# Patient Record
Sex: FEMALE | Race: WHITE | Employment: PART TIME | URBAN - METROPOLITAN AREA
[De-identification: names, ages, dates, MRNs, and addresses within clinical notes are randomized per-mention and may not be internally consistent; named-entity substitution may affect disease eponyms.]

---

## 2017-05-26 ENCOUNTER — APPOINTMENT (OUTPATIENT)
Dept: VASCULAR SURGERY | Facility: CLINIC | Age: 37
End: 2017-05-26

## 2017-05-26 PROCEDURE — 99207 ZZC VEINSOLUTIONS FREE SCREENING: CPT | Performed by: SURGERY

## 2018-01-02 ENCOUNTER — OFFICE VISIT (OUTPATIENT)
Dept: FAMILY MEDICINE | Facility: CLINIC | Age: 38
End: 2018-01-02
Payer: COMMERCIAL

## 2018-01-02 VITALS
HEART RATE: 80 BPM | SYSTOLIC BLOOD PRESSURE: 100 MMHG | BODY MASS INDEX: 20.41 KG/M2 | HEIGHT: 63 IN | DIASTOLIC BLOOD PRESSURE: 58 MMHG | WEIGHT: 115.2 LBS | RESPIRATION RATE: 12 BRPM

## 2018-01-02 DIAGNOSIS — Z00.00 ENCOUNTER FOR ROUTINE ADULT HEALTH EXAMINATION WITHOUT ABNORMAL FINDINGS: Primary | ICD-10-CM

## 2018-01-02 DIAGNOSIS — H92.02 LEFT EAR PAIN: ICD-10-CM

## 2018-01-02 DIAGNOSIS — T30.0 SKIN BURN: ICD-10-CM

## 2018-01-02 LAB
SPECIMEN SOURCE: ABNORMAL
WET PREP SPEC: ABNORMAL

## 2018-01-02 PROCEDURE — 99395 PREV VISIT EST AGE 18-39: CPT | Performed by: FAMILY MEDICINE

## 2018-01-02 PROCEDURE — 87210 SMEAR WET MOUNT SALINE/INK: CPT | Performed by: FAMILY MEDICINE

## 2018-01-02 RX ORDER — ACETAMINOPHEN AND CODEINE PHOSPHATE 120; 12 MG/5ML; MG/5ML
SOLUTION ORAL
COMMUNITY
Start: 2017-12-15 | End: 2019-01-24 | Stop reason: ALTCHOICE

## 2018-01-02 RX ORDER — PREDNISONE 20 MG/1
20 TABLET ORAL DAILY
Qty: 5 TABLET | Refills: 0 | Status: SHIPPED | OUTPATIENT
Start: 2018-01-02 | End: 2019-01-24

## 2018-01-02 RX ORDER — DESOGESTREL AND ETHINYL ESTRADIOL 0.15-0.03
1 KIT ORAL DAILY
Qty: 90 TABLET | Refills: 3 | Status: SHIPPED | OUTPATIENT
Start: 2018-01-02 | End: 2018-11-30

## 2018-01-02 RX ORDER — SILVER SULFADIAZINE 10 MG/G
CREAM TOPICAL 2 TIMES DAILY
Qty: 85 G | Refills: 1 | Status: SHIPPED | OUTPATIENT
Start: 2018-01-02 | End: 2018-01-09

## 2018-01-02 NOTE — NURSING NOTE
"Chief Complaint   Patient presents with     Physical     non-fasting       Initial /58  Pulse 80  Resp 12  Ht 5' 3\" (1.6 m)  Wt 115 lb 3.2 oz (52.3 kg)  LMP 12/12/2017  Breastfeeding? No  BMI 20.41 kg/m2 Estimated body mass index is 20.41 kg/(m^2) as calculated from the following:    Height as of this encounter: 5' 3\" (1.6 m).    Weight as of this encounter: 115 lb 3.2 oz (52.3 kg).  Medication Reconciliation: complete   Karol Padron CMA (AAMA)    "

## 2018-01-02 NOTE — PROGRESS NOTES
SUBJECTIVE:   CC: Bibi Madera is an 37 year old woman who presents for preventive health visit.     Healthy Habits:  Answers for HPI/ROS submitted by the patient on 1/2/2018   Annual Exam:  Getting at least 3 servings of Calcium per day:: Yes  Bi-annual eye exam:: Yes  Dental care twice a year:: Yes  Sleep apnea or symptoms of sleep apnea:: None  Diet:: Regular (no restrictions)  Taking medications regularly:: Yes  Medication side effects:: Not applicable  Additional concerns today:: No  PHQ-2 Score: 0    Contraception  Patient had a daughter last October. She now has two children and reports that she is done having children. She is currently still on a low dose of oral birth control (norethindrone) that she was started on while breast feeding. Patient desires to go back to the birth control she was on before her pregnancies. She reports not having any cramping during her period. She was not anemic during her pregnancy or birth. She nursed for 9 months for each child.     Burn on right hand  Patient burned her right hand, below the thumb, with steam when cooking. Incident occurred before Christmas. The burn did not burn off a layer of skin. She states that the burn has healed well since, but a blister did form and opened so she now keeps it bandaged. The are is still red and tender    Vaginal itching  A few weeks ago she thought she had a yeast infection because she was experiencing on and off itchy. She did notice slightly thicker than normal discharge but did not think it was abnormal. Inquired if symptoms could return.     Left ear pain  She has gone to a specialist 2 years ago early into her pregnancy because of a severe cold and ever since then she has continued to experience less clear hearing from her left ear. She was told from the specialist that she has water in her middle ear and will need a procedure to correct it. She was given prednisone but did not start it because she was breast feeding at  the time. Still having this plugged issue, would consider taking this med now    Diet  She tries to exercise regularly but has a difficult time doing so because of her children.     Other problems to add on...  -She had a slight thyroid enlargement when she was a child. She reports that her thyroid levels have always been normal.   -She has not had an eye exam recently    Today's PHQ-2 Score: PHQ-2 ( 1999 Pf[employer]) 1/2/2018 12/16/2016   Q1: Little interest or pleasure in doing things 0 0   Q2: Feeling down, depressed or hopeless 0 0   PHQ-2 Score 0 0   Q1: Little interest or pleasure in doing things Not at all -   Q2: Feeling down, depressed or hopeless Not at all -   PHQ-2 Score 0 -     Abuse: Current or Past(Physical, Sexual or Emotional)- No  Do you feel safe in your environment - Yes    Social History   Substance Use Topics     Smoking status: Never Smoker     Smokeless tobacco: Never Used     Alcohol use No     If you drink alcohol do you typically have >3 drinks per day or >7 drinks per week? No                     Reviewed orders with patient.  Reviewed health maintenance and updated orders accordingly - Yes    Mammogram not appropriate for this patient based on age.    Pertinent mammograms are reviewed under the imaging tab.  History of abnormal Pap smear: NO - age 30- 65 PAP every 3 years recommended    Reviewed and updated as needed this visit by clinical staff  Tobacco  Allergies  Meds  Med Hx  Surg Hx  Fam Hx  Soc Hx        Reviewed and updated as needed this visit by Provider        ROS:  C: NEGATIVE for fever, chills, change in weight  I: POSITIVE for burn on right hand NEGATIVE for worrisome rashes, moles or lesions  E: NEGATIVE for vision changes or irritation  ENT: POSITIVE for left ear pain  NEGATIVE for mouth and throat problems  R: NEGATIVE for significant cough or SOB  B: NEGATIVE for masses, tenderness or discharge  CV: NEGATIVE for chest pain, palpitations or peripheral edema  GI: NEGATIVE  "for nausea, abdominal pain, heartburn, or change in bowel habits  : POSITIVE for vaginal itching NEGATIVE for unusual urinary or vaginal symptoms. Periods are regular.  M: NEGATIVE for significant arthralgias or myalgia  N: NEGATIVE for weakness, dizziness or paresthesias  P: NEGATIVE for changes in mood or affect    This document serves as a record of the services and decisions personally performed and made by Lisbeth Hannah MD. It was created on her behalf by Serina Franklin, a trained medical scribe. The creation of this document is based on the provider's statements to the medical scribe.  Serina Franklin January 2, 2018 8:17 AM     OBJECTIVE:   /58  Pulse 80  Resp 12  Ht 5' 3\" (1.6 m)  Wt 115 lb 3.2 oz (52.3 kg)  LMP 12/12/2017  Breastfeeding? No  BMI 20.41 kg/m2     EXAM:  GENERAL: healthy, alert and no distress  EYES: Eyes grossly normal to inspection, PERRL and conjunctivae and sclerae normal  HENT: ear canals and TM's normal, nose and mouth without ulcers or lesions  NECK: no adenopathy, no asymmetry, masses, or scars and thyroid normal to palpation  RESP: lungs clear to auscultation - no rales, rhonchi or wheezes  BREAST: normal without masses, tenderness or nipple discharge and no palpable axillary masses or adenopathy  CV: regular rate and rhythm, normal S1 S2, no S3 or S4, no murmur, click or rub, no peripheral edema and peripheral pulses strong  ABDOMEN: soft, nontender, no hepatosplenomegaly, no masses and bowel sounds normal  MS: no gross musculoskeletal defects noted, no edema  SKIN: no suspicious lesions or rashes. Erythematic burn on right hand, below the thumb, superficial, opened blister area is 1cm round, tender  NEURO: Normal strength and tone, mentation intact and speech normal  PSYCH: mentation appears normal, affect normal/bright    ASSESSMENT/PLAN:   (Z00.00) Encounter for routine adult health examination without abnormal findings  (primary encounter diagnosis)  Comment: " "Patient is not fasting today. Will perform fasting labs in the future. Patient is doing well overall, other than the right hand burn and left ear pain. Discussed considering an IUD and discussed copper (progesterone only) IUD vs Mirena. Discussed possible side effects and possible risks associated with IUD's. Patient will think about it. Recommended checking with her insurance to see if it is covered.   Plan: desogestrel-ethinyl estradiol (APRI) 0.15-30         MG-MCG per tablet, Wet prep, Lipid panel reflex        to direct LDL Fasting, **TSH with free T4         reflex FUTURE anytime, Basic metabolic panel          (T30.0) Skin burn  Comment: Will start patient on silver sulfadiazine. Apply twice a day and keep area covered. Regular band-aid okay. F/u if not improving this week  Plan: silver sulfADIAZINE (SILVADENE) 1 % cream          (H92.02) Left ear pain  Comment: Will start patient on 5 day course of prednisone. Discussed possible side effects of prednisone. Recommend taking it in the morning. Recommend also taking an anti-histamine such as Claritin daily.  Plan: predniSONE (DELTASONE) 20 MG tab            COUNSELING:   Reviewed preventive health counseling, as reflected in patient instructions       Regular exercise       Healthy diet/nutrition       Contraception     reports that she has never smoked. She has never used smokeless tobacco.  Estimated body mass index is 20.41 kg/(m^2) as calculated from the following:    Height as of this encounter: 5' 3\" (1.6 m).    Weight as of this encounter: 115 lb 3.2 oz (52.3 kg).     Counseling Resources:  ATP IV Guidelines  Pooled Cohorts Equation Calculator  Breast Cancer Risk Calculator  FRAX Risk Assessment   ICSI Preventive Guidelines  Dietary Guidelines for Americans, 2010  USDA's MyPlate  ASA Prophylaxis  Lung CA Screening    The information in this document, created by the medical scribe for me, accurately reflects the services I personally performed and the " decisions made by me. I have reviewed and approved this document for accuracy prior to leaving the patient care area.  January 2, 2018 8:17 AM    Lisbeth Hannah MD  [facility]

## 2018-01-02 NOTE — MR AVS SNAPSHOT
After Visit Summary   1/2/2018    Bibi Madera    MRN: [id number]           Patient Information     Date Of Birth          1980        Visit Information        Provider Department      1/2/2018 8:00 AM Lisbeth Hannah MD [facility]        Today's Diagnoses     Encounter for routine adult health examination without abnormal findings    -  1    Skin burn        Left ear pain          Care Instructions      Preventive Health Recommendations  Female Ages 26 - 39  Yearly exam:   See your health care provider every year in order to    Review health changes.     Discuss preventive care.      Review your medicines if you your doctor has prescribed any.    Until age 30: Get a Pap test every three years (more often if you have had an abnormal result).    After age 30: Talk to your doctor about whether you should have a Pap test every 3 years or have a Pap test with HPV screening every 5 years.   You do not need a Pap test if your uterus was removed (hysterectomy) and you have not had cancer.  You should be tested each year for STDs (sexually transmitted diseases), if you're at risk.   Talk to your provider about how often to have your cholesterol checked.  If you are at risk for diabetes, you should have a diabetes test (fasting glucose).  Shots: Get a flu shot each year. Get a tetanus shot every 10 years.   Nutrition:     Eat at least 5 servings of fruits and vegetables each day.    Eat whole-grain bread, whole-wheat pasta and brown rice instead of white grains and rice.    Talk to your provider about Calcium and Vitamin D.     Lifestyle    Exercise at least 150 minutes a week (30 minutes a day, 5 days of the week). This will help you control your weight and prevent disease.    Limit alcohol to one drink per day.    No smoking.     Wear sunscreen to prevent skin cancer.    See your dentist every six months for an exam and cleaning.            Follow-ups after your visit       "  Follow-up notes from your care team     Return in about 1 year (around 1/2/2019).      Future tests that were ordered for you today     Open Future Orders        Priority Expected Expires Ordered    Lipid panel reflex to direct LDL Fasting Routine 1/2/2018 1/2/2019 1/2/2018    **TSH with free T4 reflex FUTURE anytime Routine 1/2/2018 1/2/2019 1/2/2018    Basic metabolic panel Routine  1/2/2019 1/2/2018            Who to contact     If you have questions or need follow up information about today's clinic visit or your schedule please contact [facility] directly at [phone].  Normal or non-critical lab and imaging results will be communicated to you by [employer], letter or phone within 4 business days after the clinic has received the results. If you do not hear from us within 7 days, please contact the clinic through [employer] or phone. If you have a critical or abnormal lab result, we will notify you by phone as soon as possible.  Submit refill requests through mGenerator or call your pharmacy and they will forward the refill request to us. Please allow 3 business days for your refill to be completed.          Additional Information About Your Visit        [employer] Information     mGenerator gives you secure access to your electronic health record. If you see a primary care provider, you can also send messages to your care team and make appointments. If you have questions, please call your primary care clinic.  If you do not have a primary care provider, please call [phone] and they will assist you.        Care EveryWhere ID     This is your Care EveryWhere ID. This could be used by other organizations to access your [facility] medical records  [id number]        Your Vitals Were     Pulse Respirations Height Last Period Breastfeeding? BMI (Body Mass Index)    80 12 5' 3\" (1.6 m) 12/12/2017 No 20.41 kg/m2       Blood Pressure from Last 3 Encounters:   01/02/18 100/58   12/16/16 102/58   10/09/16 " 106/65    Weight from Last 3 Encounters:   01/02/18 115 lb 3.2 oz (52.3 kg)   12/16/16 123 lb (55.8 kg)   10/07/16 139 lb (63 kg)              We Performed the Following     Wet prep          Today's Medication Changes          These changes are accurate as of: 1/2/18  8:40 AM.  If you have any questions, ask your nurse or doctor.               Start taking these medicines.        Dose/Directions    desogestrel-ethinyl estradiol 0.15-30 MG-MCG per tablet   Commonly known as:  APRI   Used for:  Encounter for routine adult health examination without abnormal findings   Started by:  Lisbeth aHnnah MD        Dose:  1 tablet   Take 1 tablet by mouth daily   Quantity:  90 tablet   Refills:  3       predniSONE 20 MG tablet   Commonly known as:  DELTASONE   Used for:  Left ear pain   Started by:  Lisbeth Hannah MD        Dose:  20 mg   Take 1 tablet (20 mg) by mouth daily   Quantity:  5 tablet   Refills:  0       silver sulfADIAZINE 1 % cream   Commonly known as:  SILVADENE   Used for:  Skin burn   Started by:  Lisbeth Hannah MD        Apply topically 2 times daily for 7 days   Quantity:  85 g   Refills:  1            Where to get your medicines      These medications were sent to [facility] [place] [address] [address] [id number]     Phone:  [phone]     desogestrel-ethinyl estradiol 0.15-30 MG-MCG per tablet    predniSONE 20 MG tablet    silver sulfADIAZINE 1 % cream                Primary Care Provider Office Phone # Fax #    Lisbeth Hannah [phone]       [id number]  [address]   [facility] [id number]        Equal Access to Services     [facility] AH: Hadii mike Diaz, waaxda luqadaha, qaybta kaalmada adeegyada, ariella robin. So [facility] [place] [phone].    ATENCIÓN: Si habla español, tiene a sun disposición servicios gratuitos de asistencia lingüística. Llame al  [phone].    We comply with applicable federal civil rights laws and [place] laws. We do not discriminate on the basis of race, color, national origin, age, disability, sex, sexual orientation, or gender identity.            Thank you!     Thank you for choosing [facility]  for your care. Our goal is always to provide you with excellent care. Hearing back from our patients is one way we can continue to improve our services. Please take a few minutes to complete the written survey that you may receive in the mail after your visit with us. Thank you!             Your Updated Medication List - Protect others around you: Learn how to safely use, store and throw away your medicines at www.disposemymeds.org.          This list is accurate as of: 18  8:40 AM.  Always use your most recent med list.                   Brand Name Dispense Instructions for use Diagnosis    desogestrel-ethinyl estradiol 0.15-30 MG-MCG per tablet    APRI    90 tablet    Take 1 tablet by mouth daily    Encounter for routine adult health examination without abnormal findings       ibuprofen 400 MG tablet    ADVIL/MOTRIN    120 tablet    Take 1-2 tablets (400-800 mg) by mouth every 6 hours as needed for other (cramping)    S/P repeat low transverse        ketoconazole 2 % shampoo    NIZORAL    120 mL    Apply to the affected area and wash off after 5 minutes.    Seborrheic dermatitis       norethindrone 0.35 MG per tablet    MICRONOR          predniSONE 20 MG tablet    DELTASONE    5 tablet    Take 1 tablet (20 mg) by mouth daily    Left ear pain       prenatal multivitamin plus iron 27-0.8 MG Tabs per tablet     100 tablet    Take 1 tablet by mouth daily.        silver sulfADIAZINE 1 % cream    SILVADENE    85 g    Apply topically 2 times daily for 7 days    Skin burn

## 2018-01-06 DIAGNOSIS — Z00.00 ENCOUNTER FOR ROUTINE ADULT HEALTH EXAMINATION WITHOUT ABNORMAL FINDINGS: ICD-10-CM

## 2018-01-06 LAB
ANION GAP SERPL CALCULATED.3IONS-SCNC: 4 MMOL/L (ref 3–14)
BUN SERPL-MCNC: 10 MG/DL (ref 7–30)
CALCIUM SERPL-MCNC: 8.7 MG/DL (ref 8.5–10.1)
CHLORIDE SERPL-SCNC: 107 MMOL/L (ref 94–109)
CHOLEST SERPL-MCNC: 162 MG/DL
CO2 SERPL-SCNC: 29 MMOL/L (ref 20–32)
CREAT SERPL-MCNC: 0.66 MG/DL (ref 0.52–1.04)
GFR SERPL CREATININE-BSD FRML MDRD: >90 ML/MIN/1.7M2
GLUCOSE SERPL-MCNC: 81 MG/DL (ref 70–99)
HDLC SERPL-MCNC: 55 MG/DL
LDLC SERPL CALC-MCNC: 88 MG/DL
NONHDLC SERPL-MCNC: 107 MG/DL
POTASSIUM SERPL-SCNC: 3.9 MMOL/L (ref 3.4–5.3)
SODIUM SERPL-SCNC: 140 MMOL/L (ref 133–144)
TRIGL SERPL-MCNC: 94 MG/DL
TSH SERPL DL<=0.005 MIU/L-ACNC: 1.64 MU/L (ref 0.4–4)

## 2018-01-06 PROCEDURE — 80048 BASIC METABOLIC PNL TOTAL CA: CPT | Performed by: FAMILY MEDICINE

## 2018-01-06 PROCEDURE — 84443 ASSAY THYROID STIM HORMONE: CPT | Performed by: FAMILY MEDICINE

## 2018-01-06 PROCEDURE — 80061 LIPID PANEL: CPT | Performed by: FAMILY MEDICINE

## 2018-01-06 PROCEDURE — 36415 COLL VENOUS BLD VENIPUNCTURE: CPT | Performed by: FAMILY MEDICINE

## 2018-05-08 ENCOUNTER — TRANSFERRED RECORDS (OUTPATIENT)
Dept: HEALTH INFORMATION MANAGEMENT | Facility: CLINIC | Age: 38
End: 2018-05-08

## 2018-11-30 DIAGNOSIS — Z00.00 ENCOUNTER FOR ROUTINE ADULT HEALTH EXAMINATION WITHOUT ABNORMAL FINDINGS: ICD-10-CM

## 2018-11-30 NOTE — LETTER
[facility]  December 3, 2018      [place] [address]           Phone :  [phone]          Fax:  [phone]  Bibi Madera  [address] [facility] [address]      Dear Bibi,    We recently received a call from your pharmacy requesting a refill of your medication - BIRTH CONTROL.    A review of your chart indicates that an appointment is required with your provider for your yearly check up appointment after 1/2/2019. Please call the clinic at [phone] to schedule your appointment.    We have authorized one refill of your medication to allow time for you to schedule your appointment.    Taking care of your health is important to us, and ongoing visits with your provider are vital to your care.  We look forward to seeing you in the near future.        Sincerely,        Lisbeth Hannah MD / Leonora Hickman RN

## 2018-11-30 NOTE — TELEPHONE ENCOUNTER
"Requested Prescriptions   Pending Prescriptions Disp Refills     JULEBER 0.15-30 MG-MCG tablet [Pharmacy Med Name: JULEBER 0.15-30MG-MCG TABS] 84 tablet 2    Last Written Prescription Date:  1/2/18  Last Fill Quantity: 90,  # refills: 3   Last Office Visit: 1/2/2018 Bri[place]       Return in about 1 year (around 1/2/2019).     Future Office Visit:      Sig: TAKE ONE TABLET BY MOUTH EVERY DAY    Contraceptives Protocol Passed    11/30/2018 11:34 AM       Passed - Patient is not a current smoker if age is 35 or older       Passed - Recent (12 mo) or future (30 days) visit within the authorizing provider's specialty    Patient had office visit in the last 12 months or has a visit in the next 30 days with authorizing provider or within the authorizing provider's specialty.  See \"Patient Info\" tab in inbasket, or \"Choose Columns\" in Meds & Orders section of the refill encounter.             Passed - No active pregnancy on record       Passed - No positive pregnancy test in past 12 months          "

## 2018-12-03 RX ORDER — DESOGESTREL AND ETHINYL ESTRADIOL 0.15-0.03
KIT ORAL
Qty: 84 TABLET | Refills: 0 | Status: SHIPPED | OUTPATIENT
Start: 2018-12-03 | End: 2019-01-24

## 2018-12-03 NOTE — TELEPHONE ENCOUNTER
Medication is being filled for 1 time refill only due to:  Patient needs to be seen because she is due soon (1/2/2019) for her yearly appointment. Letter sent to patient.   Leonora Hickman RN

## 2019-01-24 ENCOUNTER — OFFICE VISIT (OUTPATIENT)
Dept: FAMILY MEDICINE | Facility: CLINIC | Age: 39
End: 2019-01-24
Payer: COMMERCIAL

## 2019-01-24 VITALS
RESPIRATION RATE: 16 BRPM | BODY MASS INDEX: 19.67 KG/M2 | HEART RATE: 74 BPM | TEMPERATURE: 98.6 F | WEIGHT: 111 LBS | SYSTOLIC BLOOD PRESSURE: 104 MMHG | HEIGHT: 63 IN | DIASTOLIC BLOOD PRESSURE: 68 MMHG

## 2019-01-24 DIAGNOSIS — Z12.4 SCREENING FOR MALIGNANT NEOPLASM OF CERVIX: ICD-10-CM

## 2019-01-24 DIAGNOSIS — Z30.41 ENCOUNTER FOR SURVEILLANCE OF CONTRACEPTIVE PILLS: ICD-10-CM

## 2019-01-24 DIAGNOSIS — Z00.00 ROUTINE GENERAL MEDICAL EXAMINATION AT A HEALTH CARE FACILITY: Primary | ICD-10-CM

## 2019-01-24 PROCEDURE — 87624 HPV HI-RISK TYP POOLED RSLT: CPT | Performed by: NURSE PRACTITIONER

## 2019-01-24 PROCEDURE — G0145 SCR C/V CYTO,THINLAYER,RESCR: HCPCS | Performed by: NURSE PRACTITIONER

## 2019-01-24 PROCEDURE — 99395 PREV VISIT EST AGE 18-39: CPT | Performed by: NURSE PRACTITIONER

## 2019-01-24 RX ORDER — DESOGESTREL AND ETHINYL ESTRADIOL 0.15-0.03
1 KIT ORAL DAILY
Qty: 84 TABLET | Refills: 3 | Status: SHIPPED | OUTPATIENT
Start: 2019-01-24

## 2019-01-24 ASSESSMENT — ENCOUNTER SYMPTOMS
HEMATOCHEZIA: 0
FEVER: 0
WEAKNESS: 0
PALPITATIONS: 0
CHILLS: 0
DYSURIA: 0
ABDOMINAL PAIN: 0
SHORTNESS OF BREATH: 0
NERVOUS/ANXIOUS: 0
JOINT SWELLING: 0
EYE PAIN: 0
HEARTBURN: 0
CONSTIPATION: 0
FREQUENCY: 0
SORE THROAT: 0
COUGH: 0
ARTHRALGIAS: 0
DIARRHEA: 0
HEADACHES: 0
HEMATURIA: 0
PARESTHESIAS: 0
NAUSEA: 0
MYALGIAS: 0
DIZZINESS: 0

## 2019-01-24 ASSESSMENT — MIFFLIN-ST. JEOR: SCORE: 1162.62

## 2019-01-24 NOTE — PROGRESS NOTES
SUBJECTIVE:   CC: Bibi Madera is an 38 year old woman who presents for preventive health visit.     Physical   Annual:     Getting at least 3 servings of Calcium per day:  Yes    Bi-annual eye exam:  NO    Dental care twice a year:  Yes    Sleep apnea or symptoms of sleep apnea:  None    Diet:  Regular (no restrictions)    Frequency of exercise:  2-3 days/week    Duration of exercise:  30-45 minutes    Taking medications regularly:  Yes    Medication side effects:  None    Additional concerns today:  No    PHQ-2 Total Score: 0      Doing well; no complaints or concerns today.  Does need birth control pills refilled.     Today's PHQ-2 Score:   PHQ-2 ( 1999 [employer]) 1/24/2019   Q1: Little interest or pleasure in doing things 0   Q2: Feeling down, depressed or hopeless 0   PHQ-2 Score 0   Q1: Little interest or pleasure in doing things Not at all   Q2: Feeling down, depressed or hopeless Not at all   PHQ-2 Score 0       Abuse: Current or Past(Physical, Sexual or Emotional)- No  Do you feel safe in your environment? Yes    Social History     Tobacco Use     Smoking status: Never Smoker     Smokeless tobacco: Never Used   Substance Use Topics     Alcohol use: No     Alcohol/week: 0.0 oz     Alcohol Use 1/24/2019   If you drink alcohol do you typically have greater than 3 drinks per day OR greater than 7 drinks per week? No       Reviewed orders with patient.  Reviewed health maintenance and updated orders accordingly - Yes  BP Readings from Last 3 Encounters:   01/24/19 104/68   01/02/18 100/58   12/16/16 102/58    Wt Readings from Last 3 Encounters:   01/24/19 50.3 kg (111 lb)   01/02/18 52.3 kg (115 lb 3.2 oz)   12/16/16 55.8 kg (123 lb)                  Current Outpatient Medications   Medication Sig Dispense Refill     JULEBER 0.15-30 MG-MCG tablet TAKE ONE TABLET BY MOUTH EVERY DAY 84 tablet 0     ibuprofen (ADVIL,MOTRIN) 400 MG tablet Take 1-2 tablets (400-800 mg) by mouth every 6 hours as needed for other  (cramping) (Patient not taking: Reported on 2019) 120 tablet 1     ketoconazole (NIZORAL) 2 % shampoo Apply to the affected area and wash off after 5 minutes. (Patient not taking: Reported on 2019) 120 mL 1     Allergies   Allergen Reactions     Codeine      Palpitations, lightheadedness         Mammogram not appropriate for this patient based on age.    Pertinent mammograms are reviewed under the imaging tab.  History of abnormal Pap smear: NO - age 30-65 PAP every 5 years with negative HPV co-testing recommended  PAP / HPV 12/10/2015 2012 2011   PAP NIL NIL NIL     Reviewed and updated as needed this visit by clinical staff  Tobacco  Allergies  Meds  Med Hx  Surg Hx  Fam Hx  Soc Hx        Reviewed and updated as needed this visit by Provider        Past Medical History:   Diagnosis Date     Goiter     as a child      Past Surgical History:   Procedure Laterality Date      SECTION  2014    Procedure:  SECTION;   SECTION ;  Surgeon: Caroline Bañuelos MD;  Location: [facility]      SECTION N/A 10/7/2016    Procedure:  SECTION;  Surgeon: Madeleine Lyle MD;  Location: [facility]     EXTRACTION(S) DENTAL         Review of Systems   Constitutional: Negative for chills and fever.   HENT: Negative for congestion, ear pain, hearing loss and sore throat.    Eyes: Negative for pain and visual disturbance.   Respiratory: Negative for cough and shortness of breath.    Cardiovascular: Negative for chest pain, palpitations and peripheral edema.   Gastrointestinal: Negative for abdominal pain, constipation, diarrhea, heartburn, hematochezia and nausea.   Genitourinary: Negative for dysuria, frequency, genital sores, hematuria and urgency.   Musculoskeletal: Negative for arthralgias, joint swelling and myalgias.   Skin: Negative for rash.   Neurological: Negative for dizziness, weakness, headaches and paresthesias.   Psychiatric/Behavioral: Negative for mood  "changes. The patient is not nervous/anxious.           OBJECTIVE:   /68 (BP Location: Right arm, Patient Position: Sitting, Cuff Size: Adult Regular)   Pulse 74   Temp 98.6  F (37  C) (Oral)   Resp 16   Ht 1.6 m (5' 3\")   Wt 50.3 kg (111 lb)   LMP 01/14/2019 (Approximate)   BMI 19.66 kg/m    Physical Exam  GENERAL: healthy, alert and no distress  EYES: Eyes grossly normal to inspection, PERRL and conjunctivae and sclerae normal  HENT: ear canals and TM's normal, nose and mouth without ulcers or lesions  NECK: no adenopathy, no asymmetry, masses, or scars and thyroid normal to palpation  RESP: lungs clear to auscultation - no rales, rhonchi or wheezes  CV: regular rate and rhythm, normal S1 S2, no S3 or S4, no murmur, click or rub, no peripheral edema and peripheral pulses strong  ABDOMEN: soft, nontender, no hepatosplenomegaly, no masses and bowel sounds normal   (female): normal female external genitalia, normal urethral meatus, vaginal mucosa pink, moist, well rugated, and normal cervix/adnexa/uterus without masses or discharge  MS: no gross musculoskeletal defects noted, no edema  SKIN: no suspicious lesions or rashes  NEURO: Normal strength and tone, mentation intact and speech normal  PSYCH: mentation appears normal, affect normal/bright    Diagnostic Test Results:  none     ASSESSMENT/PLAN:   1. Routine general medical examination at a health care facility  Normal exam today.  She is not fasting and prefers to wait on labs and check at next physical.  Reviewed results with her from last year.  Completely normal; agree with waiting until next year.     2. Screening for malignant neoplasm of cervix    - Pap imaged thin layer screen with HPV - recommended age 30 - 65 years (select HPV order below)  - HPV High Risk Types DNA Cervical    3. Encounter for surveillance of contraceptive pills  Refilled.  Might consider IUD in the future, but doesn't like idea of not getting a monthly period.   - " "desogestrel-ethinyl estradiol (JULEBER) 0.15-30 MG-MCG tablet; Take 1 tablet by mouth daily  Dispense: 84 tablet; Refill: 3    COUNSELING:  Reviewed preventive health counseling, as reflected in patient instructions       Regular exercise       Healthy diet/nutrition       Contraception       Family planning    BP Readings from Last 1 Encounters:   01/24/19 104/68     Estimated body mass index is 19.66 kg/m  as calculated from the following:    Height as of this encounter: 1.6 m (5' 3\").    Weight as of this encounter: 50.3 kg (111 lb).           reports that  has never smoked. she has never used smokeless tobacco.      Counseling Resources:  ATP IV Guidelines  Pooled Cohorts Equation Calculator  Breast Cancer Risk Calculator  FRAX Risk Assessment  ICSI Preventive Guidelines  Dietary Guidelines for Americans, 2010  USDA's MyPlate  ASA Prophylaxis  Lung CA Screening    [facility]  "

## 2019-01-29 LAB
COPATH REPORT: NORMAL
PAP: NORMAL

## 2019-01-31 LAB
FINAL DIAGNOSIS: NORMAL
HPV HR 12 DNA CVX QL NAA+PROBE: NEGATIVE
HPV16 DNA SPEC QL NAA+PROBE: NEGATIVE
HPV18 DNA SPEC QL NAA+PROBE: NEGATIVE
SPECIMEN DESCRIPTION: NORMAL
SPECIMEN SOURCE CVX/VAG CYTO: NORMAL

## 2020-03-01 ENCOUNTER — HEALTH MAINTENANCE LETTER (OUTPATIENT)
Age: 40
End: 2020-03-01

## 2020-12-14 ENCOUNTER — HEALTH MAINTENANCE LETTER (OUTPATIENT)
Age: 40
End: 2020-12-14

## 2021-04-17 ENCOUNTER — HEALTH MAINTENANCE LETTER (OUTPATIENT)
Age: 41
End: 2021-04-17

## 2021-05-06 ENCOUNTER — OFFICE VISIT (OUTPATIENT)
Dept: VASCULAR SURGERY | Facility: CLINIC | Age: 41
End: 2021-05-06
Payer: COMMERCIAL

## 2021-05-06 DIAGNOSIS — I83.93 SPIDER VEINS OF BOTH LOWER EXTREMITIES: Primary | ICD-10-CM

## 2021-05-06 PROCEDURE — 99202 OFFICE O/P NEW SF 15 MIN: CPT | Performed by: SURGERY

## 2021-05-06 NOTE — PROGRESS NOTES
Mrs. Madera is here of evaluation of bilateral lower extremity the veins.  She has had these for a while and they do not cause any symptoms.  She has 2 children.  She reports that her mother also had similar veins.    On my examination she has bilateral lower extremity clusters of spider veins.  There is no evidence of varicose veins or venous insufficiency in either lower extremities.    I explained the pathophysiology of disease to her in detail.  I also explained that these can be dealt with a cosmetic approach.    She said she is willing to schedule sometime in the fall of this year.    Total time spent interviewing patient, examination and documentation was 17 minutes.  Greater than 50% in face-to-face counseling.

## 2021-05-06 NOTE — LETTER
5/6/2021         RE: Bibi Madera  [address] [address]        Dear Colleague,    Thank you for referring your patient, Bibi Madera, to the [facility] [place]. Please see a copy of my visit note below.    Mrs. Madera is here of evaluation of bilateral lower extremity the veins.  She has had these for a while and they do not cause any symptoms.  She has 2 children.  She reports that her mother also had similar veins.    On my examination she has bilateral lower extremity clusters of spider veins.  There is no evidence of varicose veins or venous insufficiency in either lower extremities.    I explained the pathophysiology of disease to her in detail.  I also explained that these can be dealt with a cosmetic approach.    She said she is willing to schedule sometime in the fall of this year.    Total time spent interviewing patient, examination and documentation was 17 minutes.  Greater than 50% in face-to-face counseling.      Again, thank you for allowing me to participate in the care of your patient.        Sincerely,        Christ Quiñones MD

## 2021-10-02 ENCOUNTER — HEALTH MAINTENANCE LETTER (OUTPATIENT)
Age: 41
End: 2021-10-02

## 2022-01-18 ENCOUNTER — TELEPHONE (OUTPATIENT)
Dept: VASCULAR SURGERY | Facility: CLINIC | Age: 42
End: 2022-01-18
Payer: COMMERCIAL

## 2022-01-18 NOTE — TELEPHONE ENCOUNTER
Pt calling to confirm if compression stockings will be available for her at the time of appt on 1/20/22 and if compressions are covered through insurance.

## 2022-01-19 NOTE — TELEPHONE ENCOUNTER
Pt calling again this morning with questions regarding compression hose.    Instructed pt to contact her insurance as to whether they will cover the compression hose. Most insurances do not cover with the diagnosis of 'spider veins'. Pt is aware of this.    Given pt all the pertinent information for obtaining a pair of compression hose - the compressionguru.com website, her measurements including she is a petite size, the 20-30mmhg compression grade. Gave pt the info for [employer] compression hose as these are ones we recommend.    Pt in agreement with plan - getting the compression hose on her own, and will contact us if she needs any further assistance. Pt moved her sclero ($) appt out until 2/3/22 with Dr. Quiñones.    Sadie Gomez RN  [facility]

## 2022-02-03 ENCOUNTER — OFFICE VISIT (OUTPATIENT)
Dept: VASCULAR SURGERY | Facility: CLINIC | Age: 42
End: 2022-02-03

## 2022-02-03 DIAGNOSIS — I83.93 SPIDER VEINS OF BOTH LOWER EXTREMITIES: Primary | ICD-10-CM

## 2022-02-03 PROCEDURE — 36468 NJX SCLRSNT SPIDER VEINS: CPT | Performed by: SURGERY

## 2022-02-03 PROCEDURE — S9999 SALES TAX: HCPCS | Mod: 25 | Performed by: SURGERY

## 2022-02-03 NOTE — LETTER
2/3/2022         RE: Bibi Madera  [address] [address]        Dear Colleague,    Thank you for referring your patient, Bibi Madera, to the [facility] [place]. Please see a copy of my visit note below.        Vein Clinic Sclerotherapy Note     Bibi Madera is a 41 year old female who was seen in clinic today for Sclerotherapy.    Sclerotherapy    Date/Time: 2/3/2022 10:50 AM  Performed by: Christ Quiñones MD  Authorized by: Christ Quiñones MD     Type:  Cosmetic  Session:  Full  Procedure side:  Bilateral  Solution/Amount:  .5 POLIDOCANOL  Syringes:  8  Patient tolerance:  Patient tolerated the procedure well with no immediate complications      Christ Quiñones MD          Again, thank you for allowing me to participate in the care of your patient.        Sincerely,        Christ Quiñones MD

## 2022-02-03 NOTE — PROGRESS NOTES
Vein Clinic Sclerotherapy Note     Bibi Madera is a 41 year old female who was seen in clinic today for Sclerotherapy.    Sclerotherapy    Date/Time: 2/3/2022 10:50 AM  Performed by: Christ Quiñones MD  Authorized by: Christ Quiñoens MD     Type:  Cosmetic  Session:  Full  Procedure side:  Bilateral  Solution/Amount:  .5 POLIDOCANOL  Syringes:  8  Patient tolerance:  Patient tolerated the procedure well with no immediate complications      Christ Quiñones MD

## 2022-03-17 ENCOUNTER — OFFICE VISIT (OUTPATIENT)
Dept: VASCULAR SURGERY | Facility: CLINIC | Age: 42
End: 2022-03-17
Payer: COMMERCIAL

## 2022-03-17 DIAGNOSIS — I83.93 SPIDER VEINS OF BOTH LOWER EXTREMITIES: Primary | ICD-10-CM

## 2022-03-17 PROCEDURE — 99207 PR VEINSOLUTIONS NO CHARGE VISIT: CPT | Performed by: SURGERY

## 2022-03-17 NOTE — LETTER
3/17/2022         RE: Bibi Madera  [address] [address] [address]        Dear Colleague,    Thank you for referring your patient, Bibi Madera, to the [facility] [place]. Please see a copy of my visit note below.    Mrs. Madera has had an excellent response to 2 sclerotherapy injection in both lower extremities.  She is very happy with the result.  I do not see any residual spider veins.    She can follow-up as needed.      Again, thank you for allowing me to participate in the care of your patient.        Sincerely,        Christ Quiñones MD

## 2022-03-17 NOTE — PROGRESS NOTES
Mrs. Madera has had an excellent response to 2 sclerotherapy injection in both lower extremities.  She is very happy with the result.  I do not see any residual spider veins.    She can follow-up as needed.

## 2022-05-14 ENCOUNTER — HEALTH MAINTENANCE LETTER (OUTPATIENT)
Age: 42
End: 2022-05-14

## 2022-09-03 ENCOUNTER — HEALTH MAINTENANCE LETTER (OUTPATIENT)
Age: 42
End: 2022-09-03

## 2023-04-23 ENCOUNTER — HEALTH MAINTENANCE LETTER (OUTPATIENT)
Age: 43
End: 2023-04-23

## 2024-02-17 ENCOUNTER — HEALTH MAINTENANCE LETTER (OUTPATIENT)
Age: 44
End: 2024-02-17

## 2024-04-27 ENCOUNTER — HEALTH MAINTENANCE LETTER (OUTPATIENT)
Age: 44
End: 2024-04-27